# Patient Record
Sex: FEMALE | Race: WHITE | NOT HISPANIC OR LATINO
[De-identification: names, ages, dates, MRNs, and addresses within clinical notes are randomized per-mention and may not be internally consistent; named-entity substitution may affect disease eponyms.]

---

## 2017-07-10 ENCOUNTER — RESULT REVIEW (OUTPATIENT)
Age: 32
End: 2017-07-10

## 2019-01-24 ENCOUNTER — APPOINTMENT (OUTPATIENT)
Dept: OBGYN | Facility: CLINIC | Age: 34
End: 2019-01-24

## 2019-03-11 ENCOUNTER — APPOINTMENT (OUTPATIENT)
Dept: OBGYN | Facility: CLINIC | Age: 34
End: 2019-03-11
Payer: COMMERCIAL

## 2019-03-11 VITALS
HEIGHT: 63 IN | SYSTOLIC BLOOD PRESSURE: 106 MMHG | BODY MASS INDEX: 20.73 KG/M2 | WEIGHT: 117 LBS | DIASTOLIC BLOOD PRESSURE: 60 MMHG

## 2019-03-11 DIAGNOSIS — Z01.419 ENCOUNTER FOR GYNECOLOGICAL EXAMINATION (GENERAL) (ROUTINE) W/OUT ABNORMAL FINDINGS: ICD-10-CM

## 2019-03-11 DIAGNOSIS — Z78.9 OTHER SPECIFIED HEALTH STATUS: ICD-10-CM

## 2019-03-11 PROCEDURE — 99385 PREV VISIT NEW AGE 18-39: CPT

## 2019-03-18 LAB — CYTOLOGY CVX/VAG DOC THIN PREP: NORMAL

## 2020-12-21 PROBLEM — Z01.419 ENCOUNTER FOR GYNECOLOGICAL EXAMINATION: Status: RESOLVED | Noted: 2019-03-11 | Resolved: 2020-12-21

## 2022-03-25 ENCOUNTER — APPOINTMENT (OUTPATIENT)
Dept: OBGYN | Facility: CLINIC | Age: 37
End: 2022-03-25
Payer: COMMERCIAL

## 2022-03-25 ENCOUNTER — NON-APPOINTMENT (OUTPATIENT)
Age: 37
End: 2022-03-25

## 2022-03-25 VITALS
HEIGHT: 63 IN | SYSTOLIC BLOOD PRESSURE: 100 MMHG | BODY MASS INDEX: 20.73 KG/M2 | WEIGHT: 117 LBS | DIASTOLIC BLOOD PRESSURE: 60 MMHG

## 2022-03-25 VITALS
HEIGHT: 63 IN | DIASTOLIC BLOOD PRESSURE: 60 MMHG | BODY MASS INDEX: 20.73 KG/M2 | WEIGHT: 117 LBS | SYSTOLIC BLOOD PRESSURE: 100 MMHG

## 2022-03-25 DIAGNOSIS — Z01.419 ENCOUNTER FOR GYNECOLOGICAL EXAMINATION (GENERAL) (ROUTINE) W/OUT ABNORMAL FINDINGS: ICD-10-CM

## 2022-03-25 PROCEDURE — 99385 PREV VISIT NEW AGE 18-39: CPT

## 2022-03-25 NOTE — HISTORY OF PRESENT ILLNESS
[FreeTextEntry1] : 37 y/o P2 with 2 vaginal deliveries presents for annual exam. desires to birth with a midwife should she have another baby. Reports has not had abnormal pap\par One partner-declines STI testing\par No breast or ovarian cancer\par Periods are regular in duration and timing

## 2022-03-25 NOTE — DISCUSSION/SUMMARY
[FreeTextEntry1] : 35 y/o P2 with normal annual exam\par Declines birth control-taking a passive approach to another pregnancy-if it happens it happens\par Discussed with her that if she really desires a third child she should consider making an attempt in the next year or two since risk of chromosonal defects increases especially past the age of 40\par Pap collected\par Advised to start a PNV\par

## 2022-03-30 LAB — HPV HIGH+LOW RISK DNA PNL CVX: NOT DETECTED

## 2022-04-01 ENCOUNTER — APPOINTMENT (OUTPATIENT)
Dept: OBGYN | Facility: CLINIC | Age: 37
End: 2022-04-01

## 2022-04-01 LAB — CYTOLOGY CVX/VAG DOC THIN PREP: NORMAL

## 2022-09-26 ENCOUNTER — APPOINTMENT (OUTPATIENT)
Dept: OBGYN | Facility: CLINIC | Age: 37
End: 2022-09-26
Payer: COMMERCIAL

## 2022-09-26 ENCOUNTER — NON-APPOINTMENT (OUTPATIENT)
Age: 37
End: 2022-09-26

## 2022-09-26 VITALS — HEIGHT: 63 IN | WEIGHT: 125 LBS | BODY MASS INDEX: 22.15 KG/M2

## 2022-09-26 PROCEDURE — 76830 TRANSVAGINAL US NON-OB: CPT

## 2022-09-26 PROCEDURE — 99203 OFFICE O/P NEW LOW 30 MIN: CPT

## 2022-09-26 NOTE — PROCEDURE
[Transvaginal OB Sonogram] : Transvaginal OB Sonogram [FreeTextEntry1] : - gs; no ys; nl adnexa; no free fluid

## 2022-09-26 NOTE — DISCUSSION/SUMMARY
[FreeTextEntry1] : I discussed the clinical picture at length with the patient.  I discussed high likelihood of complete AB here but discussed some possibility of ectopic pregnancy.  Signs symptoms were discussed.  I am obtaining serial beta hCGs and patient will go today for her first 1.  She also reports that she is Rh- and we discussed considering RhoGAM after her blood work today.  All her questions were answered.

## 2022-09-26 NOTE — HISTORY OF PRESENT ILLNESS
[FreeTextEntry1] : This is a 36-year-old white female  3 para 2 who presents as a new patient with positive UCG and complaint of heavy vaginal bleeding.  She denies any pelvic pain.  Her LMP is 2022 giving her an Edc of 2023 and EGA of 7 weeks and 1 day.  Patient was not on birth control.  She denies feeling lightheaded or dizzy.\par \par She denies any medical or surgical history.  OB history significant for 2 vaginal deliveries.  She denies alcohol tobacco or drug use during pregnancy.

## 2022-09-27 ENCOUNTER — LABORATORY RESULT (OUTPATIENT)
Age: 37
End: 2022-09-27

## 2022-09-29 ENCOUNTER — LABORATORY RESULT (OUTPATIENT)
Age: 37
End: 2022-09-29

## 2022-09-29 ENCOUNTER — APPOINTMENT (OUTPATIENT)
Dept: OBGYN | Facility: CLINIC | Age: 37
End: 2022-09-29

## 2022-09-29 VITALS
SYSTOLIC BLOOD PRESSURE: 100 MMHG | WEIGHT: 125 LBS | HEIGHT: 63 IN | BODY MASS INDEX: 22.15 KG/M2 | DIASTOLIC BLOOD PRESSURE: 72 MMHG

## 2022-09-29 PROCEDURE — 96372 THER/PROPH/DIAG INJ SC/IM: CPT

## 2022-09-29 PROCEDURE — 99213 OFFICE O/P EST LOW 20 MIN: CPT | Mod: 25

## 2022-09-29 PROCEDURE — 90384 RH IG FULL-DOSE IM: CPT | Mod: 59

## 2022-09-29 NOTE — PLAN
[FreeTextEntry1] : rhogam admin'd.\par dw pt lw and fu, recs on when to try to conceive after sab.. declines cytotec tx. \par has fu appt next week. \par vb has been like a period, denies lightheadedness, dizziness.

## 2022-09-29 NOTE — REVIEW OF SYSTEMS
Sacral contusion:    1) NSAID for inflammation.  Patient has diclofenac on file given recently.  Use this as prescribed.     2) Lidocaine patch - for numbing the area and treating pain.  Safe to wear all day and at work.    3) tizanidine - Rx muscle relaxer.  Already have at home.  Use in evening after done with work or driving/riding bike.   [Abn Vaginal bleeding] : abnormal vaginal bleeding [Negative] : Heme/Lymph

## 2022-09-29 NOTE — HISTORY OF PRESENT ILLNESS
[FreeTextEntry1] : 37 yo p2 here to fu after bw and us for threatened ab. beta hcg was 1250, too early to see iup. pt has ongoing vaginal bleeding, not painful, passed some clots and poss tisssue. went for quant beta this morning, not reported yet. \par dw pt rba rhogam, and conflicting recs re usage before 12 weeks ega. \par dw pt fu based on betas rising or falling.

## 2022-10-03 ENCOUNTER — APPOINTMENT (OUTPATIENT)
Dept: OBGYN | Facility: CLINIC | Age: 37
End: 2022-10-03
Payer: COMMERCIAL

## 2022-10-03 VITALS
SYSTOLIC BLOOD PRESSURE: 110 MMHG | BODY MASS INDEX: 22.15 KG/M2 | WEIGHT: 125 LBS | DIASTOLIC BLOOD PRESSURE: 70 MMHG | HEIGHT: 63 IN

## 2022-10-03 PROCEDURE — 99213 OFFICE O/P EST LOW 20 MIN: CPT

## 2022-10-03 NOTE — DISCUSSION/SUMMARY
[FreeTextEntry1] : I discussed the clinical situation at length with the patient.  I discussed high likelihood of miscarriage and I discussed the need to follow her hCG quantitative's down to less than 5.  I also discussed etiology of miscarriages using pictorial aids.  I reassured her that she did not cause this.  Patient has 2 prior children with the same partner and we also discussed that this miscarriage does not impact future pregnancies.  I discussed advancing age and declining fertility as well as elevation of miscarriage rate.  All her questions were answered.  She was given requisitions for follow-up beta-hCG quantitative's.
Yes...

## 2022-10-03 NOTE — HISTORY OF PRESENT ILLNESS
[FreeTextEntry1] : 36-year-old white female  3 para 2 here for follow-up visit.  Patient had LMP of 2022 giving her age EGA of 8+ weeks.  Patient presented with vaginal bleeding that she said then progressed to somewhat heavy vaginal bleeding.  Patient had ultrasound that revealed no gestational sac and she was sent for blood work.  Blood work on  was 1264 hCG and on  it was 502.  She was also sent for blood work and she is Rh- with negative antibody titer and she came in and received your RhoGAM.  She intends to have more children.\par \par Patient currently denies any vaginal bleeding or pain.

## 2022-10-05 ENCOUNTER — LABORATORY RESULT (OUTPATIENT)
Age: 37
End: 2022-10-05

## 2022-10-11 ENCOUNTER — LABORATORY RESULT (OUTPATIENT)
Age: 37
End: 2022-10-11

## 2022-10-14 ENCOUNTER — APPOINTMENT (OUTPATIENT)
Dept: OBGYN | Facility: CLINIC | Age: 37
End: 2022-10-14

## 2022-10-19 LAB — HCG SERPL-MCNC: 4 MIU/ML

## 2023-04-21 ENCOUNTER — RESULT CHARGE (OUTPATIENT)
Age: 38
End: 2023-04-21

## 2023-04-21 ENCOUNTER — APPOINTMENT (OUTPATIENT)
Dept: OBGYN | Facility: CLINIC | Age: 38
End: 2023-04-21
Payer: COMMERCIAL

## 2023-04-21 VITALS
SYSTOLIC BLOOD PRESSURE: 116 MMHG | DIASTOLIC BLOOD PRESSURE: 76 MMHG | HEIGHT: 63 IN | WEIGHT: 124 LBS | BODY MASS INDEX: 21.97 KG/M2

## 2023-04-21 DIAGNOSIS — O03.4 INCOMPLETE SPONTANEOUS ABORTION W/OUT COMPLICATION: ICD-10-CM

## 2023-04-21 DIAGNOSIS — Z67.91 UNSPECIFIED BLOOD TYPE, RH NEGATIVE: ICD-10-CM

## 2023-04-21 LAB
HCG UR QL: POSITIVE
QUALITY CONTROL: YES

## 2023-04-21 PROCEDURE — 81025 URINE PREGNANCY TEST: CPT

## 2023-04-21 PROCEDURE — 99214 OFFICE O/P EST MOD 30 MIN: CPT | Mod: 25

## 2023-04-21 PROCEDURE — 76815 OB US LIMITED FETUS(S): CPT

## 2023-04-22 NOTE — HISTORY OF PRESENT ILLNESS
[FreeTextEntry1] : 37-year-old female -0-1-2 presenting office with vaginal bleeding in early pregnancy.  Patient has not had an ultrasound to confirm intrauterine pregnancy to this date.  She refers the bleeding to be heavy with clots.  She recently underwent a miscarriage in 2022.  Her blood type is Rh- and she did require RhoGAM in the past.\par \par Obstetric history of 2 prior vaginal deliveries and 1 miscarriage which did not require instrumentation or medication.  Denies gynecologic history uterine fibroids, ovarian cyst, abnormal Pap smears.  Denies medical and surgical history.  Denies personal family history gynecologic, breast, colon cancer.  Denies alcohol, tobacco, illicit drug use.  Denies allergies to medications.

## 2023-04-22 NOTE — COUNSELING
[Pregnancy Options] : pregnancy options [Confidentiality] : confidentiality [Lab Results] : lab results [Medication Management] : medication management

## 2023-04-22 NOTE — PROCEDURE
[Transvaginal OB Sonogram] : Transvaginal OB Sonogram [Intrauterine Pregnancy] : intrauterine pregnancy [Yolk Sac] : no yolk sac [Fetal Heart] : no fetal heart [Date: ___] : Date: [unfilled] [Current GA by Sonogram: ___ (wks)] : Current GA by Sonogram: [unfilled]Uwks [___ day(s)] : [unfilled] days [FreeTextEntry1] : Intrauterine pregnancy consisting of a gestational sac measuring 14.2 mm, endometrial lining appears to be thickened in pregnancy is fundally located

## 2023-04-22 NOTE — PLAN
[FreeTextEntry1] : \par Transvaginal/pelvic ultrasound significant for an intrauterine pregnancy consisting of a gestational sac measuring 14.2 mm, fundally located.  Patient will undergo progesterone and sequential hCG testing.  She be contacted with the results.  She has been counseled on the threatened .  RhoGAM was administered into the left deltoid Lot number [id number] with expiration date of 2025.  She is given option ask questions all questions were addressed.  She was given call, follow-up, ER precautions regarding signs and symptoms of acute anemia and abnormal uterine bleeding.

## 2023-04-22 NOTE — PHYSICAL EXAM
[Appropriately responsive] : appropriately responsive [Alert] : alert [No Acute Distress] : no acute distress [Soft] : soft [Non-tender] : non-tender [Non-distended] : non-distended [No HSM] : No HSM [No Lesions] : no lesions [No Mass] : no mass [Oriented x3] : oriented x3 [Labia Minora] : normal [Labia Majora] : normal [Normal] : normal [Uterine Adnexae] : normal [FreeTextEntry4] : Moderate maroon-colored blood in the posterior fornix [FreeTextEntry5] : No loss of blood or active bleeding on Valsalva from the cervical os

## 2023-04-23 ENCOUNTER — NON-APPOINTMENT (OUTPATIENT)
Age: 38
End: 2023-04-23

## 2023-04-23 LAB
ABO + RH PNL BLD: NORMAL
ALBUMIN SERPL ELPH-MCNC: 4.8 G/DL
ALP BLD-CCNC: 48 U/L
ALT SERPL-CCNC: 17 U/L
ANION GAP SERPL CALC-SCNC: 15 MMOL/L
AST SERPL-CCNC: 22 U/L
BASOPHILS # BLD AUTO: 0.04 K/UL
BASOPHILS NFR BLD AUTO: 0.6 %
BILIRUB SERPL-MCNC: 0.6 MG/DL
BLD GP AB SCN SERPL QL: NORMAL
BUN SERPL-MCNC: 10 MG/DL
CALCIUM SERPL-MCNC: 9.7 MG/DL
CHLORIDE SERPL-SCNC: 104 MMOL/L
CO2 SERPL-SCNC: 22 MMOL/L
CREAT SERPL-MCNC: 0.68 MG/DL
EGFR: 114 ML/MIN/1.73M2
EOSINOPHIL # BLD AUTO: 0.04 K/UL
EOSINOPHIL NFR BLD AUTO: 0.6 %
GLUCOSE SERPL-MCNC: 88 MG/DL
HCG SERPL-MCNC: ABNORMAL MIU/ML
HCT VFR BLD CALC: 36.7 %
HGB BLD-MCNC: 12.1 G/DL
IMM GRANULOCYTES NFR BLD AUTO: 0.2 %
LYMPHOCYTES # BLD AUTO: 1.82 K/UL
LYMPHOCYTES NFR BLD AUTO: 27.6 %
MAN DIFF?: NORMAL
MCHC RBC-ENTMCNC: 32.1 PG
MCHC RBC-ENTMCNC: 33 GM/DL
MCV RBC AUTO: 97.3 FL
MONOCYTES # BLD AUTO: 0.32 K/UL
MONOCYTES NFR BLD AUTO: 4.9 %
NEUTROPHILS # BLD AUTO: 4.36 K/UL
NEUTROPHILS NFR BLD AUTO: 66.1 %
PLATELET # BLD AUTO: 298 K/UL
POTASSIUM SERPL-SCNC: 4 MMOL/L
PROGEST SERPL-MCNC: 8.5 NG/ML
PROT SERPL-MCNC: 7.2 G/DL
RBC # BLD: 3.77 M/UL
RBC # FLD: 11.9 %
SODIUM SERPL-SCNC: 140 MMOL/L
WBC # FLD AUTO: 6.59 K/UL

## 2023-05-09 ENCOUNTER — APPOINTMENT (OUTPATIENT)
Dept: OBGYN | Facility: CLINIC | Age: 38
End: 2023-05-09

## 2023-11-03 ENCOUNTER — APPOINTMENT (OUTPATIENT)
Dept: DERMATOLOGY | Facility: CLINIC | Age: 38
End: 2023-11-03
Payer: COMMERCIAL

## 2023-11-03 PROCEDURE — 99203 OFFICE O/P NEW LOW 30 MIN: CPT

## 2024-01-05 ENCOUNTER — APPOINTMENT (OUTPATIENT)
Dept: OBGYN | Facility: CLINIC | Age: 39
End: 2024-01-05

## 2024-04-05 ENCOUNTER — APPOINTMENT (OUTPATIENT)
Dept: OBGYN | Facility: CLINIC | Age: 39
End: 2024-04-05
Payer: COMMERCIAL

## 2024-04-05 VITALS
BODY MASS INDEX: 22.5 KG/M2 | DIASTOLIC BLOOD PRESSURE: 79 MMHG | SYSTOLIC BLOOD PRESSURE: 125 MMHG | HEIGHT: 63 IN | WEIGHT: 127 LBS

## 2024-04-05 DIAGNOSIS — O20.9 HEMORRHAGE IN EARLY PREGNANCY, UNSPECIFIED: ICD-10-CM

## 2024-04-05 DIAGNOSIS — N91.1 SECONDARY AMENORRHEA: ICD-10-CM

## 2024-04-05 DIAGNOSIS — Z32.01 ENCOUNTER FOR PREGNANCY TEST, RESULT POSITIVE: ICD-10-CM

## 2024-04-05 DIAGNOSIS — Z87.59 PERSONAL HISTORY OF OTHER COMPLICATIONS OF PREGNANCY, CHILDBIRTH AND THE PUERPERIUM: ICD-10-CM

## 2024-04-05 PROCEDURE — 99214 OFFICE O/P EST MOD 30 MIN: CPT

## 2024-04-05 PROCEDURE — 76817 TRANSVAGINAL US OBSTETRIC: CPT

## 2024-04-05 PROCEDURE — 81025 URINE PREGNANCY TEST: CPT

## 2024-04-05 NOTE — PLAN
[FreeTextEntry1] : Transvaginal/pelvic ultrasound significant for a viable intrauterine pregnancy with a flickering fetal heart and fundally located.  RBA discussed at length the patient will return to office in 7 days time for confirmation of dating and viability.  She will continue her prenatal vitamin as directed.  She has recently undergone 3 miscarriages in a row and was counseled on potential miscarriage and diagnosis of recurrent pregnancy loss.  She will be started on aspirin 81 mg at 12 weeks gestation.  Her blood type is Rh- and if she begins to bleed she will require RhoGAM.  She is given option ask questions all questions were addressed.  She understands the plan of care.  She was given call, follow-up, ER precautions regarding signs and symptoms of abnormal bleeding and miscarriage.

## 2024-04-05 NOTE — PROCEDURE
[Transvaginal OB Sonogram] : Transvaginal OB Sonogram [Intrauterine Pregnancy] : intrauterine pregnancy [Yolk Sac] : yolk sac present [Fetal Heart] : fetal heart present [CRL: ___ (mm)] : CRL - [unfilled]Umm [Date: ___] : Date: [unfilled] [Current GA by Sonogram: ___ (wks)] : Current GA by Sonogram: [unfilled]Uwks [___ day(s)] : [unfilled] days [Transvaginal OB Sonogram WNL] : Transvaginal OB Sonogram WNL [FreeTextEntry1] : Intrauterine pregnancy, viable with a flickering fetal heart rate of 118 bpm, fundally located

## 2024-04-05 NOTE — HISTORY OF PRESENT ILLNESS
[FreeTextEntry1] : 38-year-old female -0-3-2 presenting office for confirmation of pregnancy.  LMP is 2024 placing her at 7 weeks 0 days gestation with an EDC of 2024.  She has not had an ultrasound to confirm intrauterine pregnancy to this date, but does report positive at-home urine pregnancy test.  Patient reports a recent miscarriage in addition to the 1 she underwent this past April.  She is reporting some light cramping and is understandably nervous.  This is a welcomed pregnancy, but a surprise.  Her blood type is Rh- and she has received RhoGAM in the past.  Obstetric history of 2 prior vaginal deliveries and her last 3 pregnancies, ending in miscarriage not requiring medication or instrumentation.  Denies gynecologic history uterine fibroids, ovarian cyst, abnormal Pap smears.  Pap smear 3/25/2022 resulted NILM and negative high-risk HPV.  Denies medical and surgical history.  Denies personal family history gynecologic, breast, colon cancer.  Denies alcohol, tobacco, illicit drug use.  Denies allergies to medications.

## 2024-04-06 LAB — HCG UR QL: POSITIVE

## 2024-04-12 ENCOUNTER — ASOB RESULT (OUTPATIENT)
Age: 39
End: 2024-04-12

## 2024-04-12 ENCOUNTER — APPOINTMENT (OUTPATIENT)
Dept: ANTEPARTUM | Facility: CLINIC | Age: 39
End: 2024-04-12
Payer: COMMERCIAL

## 2024-04-12 ENCOUNTER — APPOINTMENT (OUTPATIENT)
Dept: OBGYN | Facility: CLINIC | Age: 39
End: 2024-04-12
Payer: COMMERCIAL

## 2024-04-12 VITALS — BODY MASS INDEX: 22.5 KG/M2 | WEIGHT: 127 LBS | HEIGHT: 63 IN

## 2024-04-12 DIAGNOSIS — Z34.91 ENCOUNTER FOR SUPERVISION OF NORMAL PREGNANCY, UNSPECIFIED, FIRST TRIMESTER: ICD-10-CM

## 2024-04-12 DIAGNOSIS — B37.31 ACUTE CANDIDIASIS OF VULVA AND VAGINA: ICD-10-CM

## 2024-04-12 DIAGNOSIS — O09.529 SUPERVISION OF ELDERLY MULTIGRAVIDA, UNSPECIFIED TRIMESTER: ICD-10-CM

## 2024-04-12 DIAGNOSIS — O26.899 OTHER SPECIFIED PREGNANCY RELATED CONDITIONS, UNSPECIFIED TRIMESTER: ICD-10-CM

## 2024-04-12 DIAGNOSIS — Z67.91 OTHER SPECIFIED PREGNANCY RELATED CONDITIONS, UNSPECIFIED TRIMESTER: ICD-10-CM

## 2024-04-12 DIAGNOSIS — O26.20 PREGNANCY CARE FOR PATIENT WITH RECURRENT PREGNANCY LOSS, UNSPECIFIED TRIMESTER: ICD-10-CM

## 2024-04-12 PROCEDURE — 76801 OB US < 14 WKS SINGLE FETUS: CPT

## 2024-04-12 PROCEDURE — 99214 OFFICE O/P EST MOD 30 MIN: CPT

## 2024-04-12 RX ORDER — TERCONAZOLE 4 MG/G
0.4 CREAM VAGINAL
Qty: 1 | Refills: 1 | Status: ACTIVE | COMMUNITY
Start: 2024-04-12 | End: 1900-01-01

## 2024-04-13 NOTE — HISTORY OF PRESENT ILLNESS
[FreeTextEntry1] : 38-year-old female -0-3-2 presenting to office for confirmation of viability.  She was seen in office on 2023 for confirmation of pregnancy and a intrauterine pregnancy was visualized with a flickering fetal heart.  Patient reports no adverse signs since her last appointment.  She does report symptoms of a vaginal yeast infection and would like medication.  Her LMP is 2024 placing her at 8 weeks 0 days gestation with an EDC of 2024.  Patient reports a recent miscarriage in addition to the 1 she underwent this past April.  She is reporting some light cramping and is understandably nervous.  This is a welcomed pregnancy, but a surprise.  Her blood type is Rh- and she has received RhoGAM in the past.  Obstetric history of 2 prior vaginal deliveries and her last 3 pregnancies, ending in miscarriage not requiring medication or instrumentation.  Denies gynecologic history uterine fibroids, ovarian cyst, abnormal Pap smears.  Pap smear 3/25/2022 resulted NILM and negative high-risk HPV.  Denies medical and surgical history.  Denies personal family history gynecologic, breast, colon cancer.  Denies alcohol, tobacco, illicit drug use.  Denies allergies to medications.

## 2024-04-13 NOTE — COUNSELING
[Nutrition/ Exercise/ Weight Management] : nutrition, exercise, weight management [Vitamins/Supplements] : vitamins/supplements [Pregnancy Options] : pregnancy options [Confidentiality] : confidentiality [Lab Results] : lab results [Medication Management] : medication management

## 2024-04-13 NOTE — PLAN
[FreeTextEntry1] : Transvaginal/pelvic ultrasound performed immediately prior to this appointment is significant for a viable intrauterine pregnancy with a fetal heart rate of 145 bpm, regular rhythm.  CRL does not recreate with reported dating and EDC will be adjusted to 11/28/2024.  She return to office in 3 weeks time for initial prenatal appointment.  She was given strict call, follow-up, ER precautions and all questions were addressed.  She understands plan of care.  Rx for vaginal yeast infection sent to pharmacy with direction.

## 2024-05-07 ENCOUNTER — APPOINTMENT (OUTPATIENT)
Dept: OBGYN | Facility: CLINIC | Age: 39
End: 2024-05-07